# Patient Record
Sex: FEMALE | Race: WHITE | NOT HISPANIC OR LATINO | Employment: PART TIME
[De-identification: names, ages, dates, MRNs, and addresses within clinical notes are randomized per-mention and may not be internally consistent; named-entity substitution may affect disease eponyms.]

---

## 2023-09-21 ENCOUNTER — TRANSCRIBE ORDERS (OUTPATIENT)
Dept: OTHER | Age: 58
End: 2023-09-21

## 2023-09-21 DIAGNOSIS — K11.8 PAROTID MASS: Primary | ICD-10-CM

## 2023-09-22 ENCOUNTER — TELEPHONE (OUTPATIENT)
Dept: OTOLARYNGOLOGY | Facility: CLINIC | Age: 58
End: 2023-09-22
Payer: COMMERCIAL

## 2023-09-22 NOTE — TELEPHONE ENCOUNTER
FUTURE VISIT INFORMATION:      FUTURE VISIT INFORMATION:  Date: 10/11/23  Time: 3 PM  Location: [facility]  REFERRAL INFORMATION:  Referring provider: John Simon MD   Referring providers clinic: [facility]  Reason for visit/diagnosis:  Parotid mass referred from Dr. John Simon     RECORDS REQUESTED FROM:       Clinic name Comments Records Status Imaging Status   [facility] 9/18/2023 note from John Simon MD  CE    Imaging  MRN: [id number]  US FNA Superficial 7/31/2023  CT neck 7/18/2023  US Neck/Head 12/14/2022 - requested -PACS CE PACS   [facility]  [address] [facility] [address]  Phone [phone]  Fax [phone]             7/31/2023 Case: [id number]   FINAL DIAGNOSIS    A.  Salivary Gland, Parotid, right, fine needle aspiration:  Non-diagnostic: Insufficient cellular material for cytologic interpretation     Comment:  Scant benign salivary acini and stromal elements present.  Reviewed by RWS.     [employer]ex: [id number] CE    Path req 10/3/23    Received path 10/4/23             September 22, 2023 at 1:22 PM - req for 2022 US neck pushed to [place] -Ena  September 25, 2023 at 5:20 PM - Received US neck already resolved in PACS.  October 3, 2023 at 6:55 AM - request for path slides -Ena  October 4, 2023 3:35 PM - Received path from lewis and took it to 5th fl to be process - Fabienne

## 2023-09-22 NOTE — TELEPHONE ENCOUNTER
[facility] Call Center    Phone Message    May a detailed message be left on voicemail: yes     Reason for Call: Appointment Intake    Referring Provider Name: Dr Todd Anderson [facility]  Diagnosis and/or Symptoms: Right parotid mass.     Per Heide ROMERO to have reviewed    Action Taken: Message routed to:  [facility] ENT    Travel Screening: Not Applicable

## 2023-10-05 ENCOUNTER — LAB REQUISITION (OUTPATIENT)
Dept: LAB | Facility: CLINIC | Age: 58
End: 2023-10-05
Payer: COMMERCIAL

## 2023-10-05 LAB
PATH REPORT.COMMENTS IMP SPEC: NORMAL
PATH REPORT.FINAL DX SPEC: NORMAL
PATH REPORT.MICROSCOPIC SPEC OTHER STN: NORMAL
PATH REPORT.RELEVANT HX SPEC: NORMAL
PATH REPORT.RELEVANT HX SPEC: NORMAL
PATH REPORT.SITE OF ORIGIN SPEC: NORMAL

## 2023-10-05 PROCEDURE — 88321 CONSLTJ&REPRT SLD PREP ELSWR: CPT | Performed by: PATHOLOGY

## 2023-10-11 ENCOUNTER — PRE VISIT (OUTPATIENT)
Dept: OTOLARYNGOLOGY | Facility: CLINIC | Age: 58
End: 2023-10-11

## 2023-10-11 ENCOUNTER — OFFICE VISIT (OUTPATIENT)
Dept: OTOLARYNGOLOGY | Facility: CLINIC | Age: 58
End: 2023-10-11
Attending: OTOLARYNGOLOGY
Payer: COMMERCIAL

## 2023-10-11 VITALS
WEIGHT: 115 LBS | OXYGEN SATURATION: 96 % | SYSTOLIC BLOOD PRESSURE: 146 MMHG | BODY MASS INDEX: 19.63 KG/M2 | TEMPERATURE: 98.9 F | HEIGHT: 64 IN | HEART RATE: 87 BPM | DIASTOLIC BLOOD PRESSURE: 73 MMHG

## 2023-10-11 DIAGNOSIS — K11.8 PAROTID MASS: ICD-10-CM

## 2023-10-11 PROCEDURE — 10005 FNA BX W/US GDN 1ST LES: CPT | Performed by: OTOLARYNGOLOGY

## 2023-10-11 PROCEDURE — 88305 TISSUE EXAM BY PATHOLOGIST: CPT | Mod: 26 | Performed by: PATHOLOGY

## 2023-10-11 PROCEDURE — 88173 CYTOPATH EVAL FNA REPORT: CPT | Mod: 26 | Performed by: PATHOLOGY

## 2023-10-11 PROCEDURE — 99204 OFFICE O/P NEW MOD 45 MIN: CPT | Mod: 25 | Performed by: OTOLARYNGOLOGY

## 2023-10-11 PROCEDURE — 88173 CYTOPATH EVAL FNA REPORT: CPT | Mod: TC | Performed by: OTOLARYNGOLOGY

## 2023-10-11 ASSESSMENT — PAIN SCALES - GENERAL: PAINLEVEL: NO PAIN (1)

## 2023-10-11 NOTE — NURSING NOTE
"Chief Complaint   Patient presents with    Consult     Parotid mass      Blood pressure (!) 146/73, pulse 87, temperature 98.9  F (37.2  C), height 1.626 m (5' 4\"), weight 52.2 kg (115 lb), SpO2 96%.  Nicolas Grigsby LPN    "

## 2023-10-11 NOTE — PROGRESS NOTES
History of Present Illness: The patient is a 58-year-old woman who was recently seen by Dr. John Simon and noted to have some discomfort on the left side of her cheek.  Imaging was obtained which actually revealed nothing on the left side but a mass in the right parotid gland.  Patient initially noticed it to be very small but feels it is grown a fair amount over the last few weeks with possible doubling in size.  He has not had any facial nerve weakness or twitching and she denies any numbness in a greater auricular nerve distribution.  She has not noticed any lumps or bumps in her neck.  She did have a fine-needle biopsy performed in the outside with ultrasound guidance which was nondiagnostic.        No past medical history on file.  No past surgical history on file.  No current outpatient medications on file.  No Known Allergies  Social History     Tobacco Use    Smoking status: Never    Smokeless tobacco: Never   Substance Use Topics    Alcohol use: Yes     Alcohol/week: 3.0 standard drinks of alcohol     Types: 3 Standard drinks or equivalent per week     Review Of Systems  Skin: negative  Eyes: negative  Ears/Nose/Throat: negative  Respiratory: No shortness of breath, dyspnea on exertion, cough, or hemoptysis  Cardiovascular: negative  Gastrointestinal: negative  Genitourinary: negative  Musculoskeletal: negative  Neurologic: negative  Psychiatric: negative  Hematologic/Lymphatic/Immunologic: negative  Endocrine: negative    Physical examination: The patient is well-appearing in no distress although quite anxious.  Examination of the oral cavity reveals normal mucosa of the tongue, floor mouth, genital and bucca mucosa, hard and soft palate, and posterior pharyngeal wall.  Palpation of the floor mouth, tongue and tongue base are negative.  She cannot tolerate mirror exam.  Examination of the neck reveals a rubbery mass palpable in the preauricular and infra-auricular area on the right side consistent with  a parotid lesion.  The rest of the neck and the left rotted are negative.    Imaging: The patient CT scan and ultrasound were reviewed, the CT scan suggested heterogeneous contrast-enhancing mass within the parotid gland which is not overtly malignant appearing.  On ultrasound, the mass appears entirely anechoic with smooth borders.    Procedure: Repeat fine biopsy was done of the right parotid mass under ultrasound guidance, 2 separate passes with a 25-gauge needle were taken with ultrasound confirmation of the needle well within the mass.    Impression: Right parotid mass, as yet undetermined pathologic diagnosis    Plan:  1.  I counseled the patient that most likely she will require a right-sided parotidectomy.  We discussed the nature of the incision, and risks which include, but are not limited to, facial nerve weakness or injury, preauricular nerve distribution numbness and Bj's syndrome.  All of their questions were answered.    2.  I counseled the patient and  that I prefer to get a diagnosis ahead of time if possible hence today's biopsy.  However most of the lesions that are generated in the parotid gland turned out to be benign and my suspicion is that this would be a benign lesion or low-grade malignancy.  However, they understand that without a diagnosis ahead of time, it certainly could be a malignancy and I would only be able to ascertain that during intraoperative pathologic analysis.    3.  We will wait for the biopsy result from today and hope for diagnostic specimen but likely proceed to surgery regardless.    Armani Fraser M.D.

## 2023-10-12 ENCOUNTER — PREP FOR PROCEDURE (OUTPATIENT)
Dept: OTOLARYNGOLOGY | Facility: CLINIC | Age: 58
End: 2023-10-12
Payer: COMMERCIAL

## 2023-10-12 DIAGNOSIS — K11.8 PAROTID MASS: Primary | ICD-10-CM

## 2023-10-12 NOTE — NURSING NOTE
Teaching Flowsheet - ENT   Relevant Diagnosis:  parotid mass  Teaching Topic: parotidectomy   Person(s) involved in teaching: patient and spouse        Motivation Level: high  Asks Questions:   Yes  Eager to Learn:   Yes  Cooperative:   Yes  Receptive (willing/able to accept information):   Yes  Comments: Reviewed pre-op H and P,  NPO prior to  surgery,  pre-op scrub (given Hibiclens)  Reviewed post-op  cares , activity and pain.     Patient demonstrates understanding of the following:  Reason for the appointment, diagnosis and treatment plan:   Yes  Knowledge of proper use of medications and conditions for which they are ordered (with special attention to potential side effects or drug interactions):  stop aspirin products 1 week before surgery Yes  Which situations necessitate calling provider and whom to contact:   Yes  Nutritional needs and diet plan:   Yes  Pain management techniques:   Yes  Patient instructed on hand hygiene:  Yes  How and/when to access community resources:   Yes     Infection Prevention:  Patient   demonstrates understanding of the following:  Surgical procedure site care taught Yes  Signs and symptoms of infection taught Yes  Wound care taught Yes  Instructional Materials Used/Given: pre- op booklet,verbal  Instruction.    Teri Ramirez, RN, BSN

## 2023-10-12 NOTE — PATIENT INSTRUCTIONS
1. We will call you to arrange date/time for surgery  2. Please schedule a pre-op physical with their primary MD within 30 days of the procedure.   3. Please avoid blood thinning medications 1 week prior to surgery (Ibuprofen, Aleve, Aspirin, etc.)   4. Please review contents within the surgical packet & use the antiseptic scrub as directed.   5. Please call NEELAM Williamson with any further questions [phone]

## 2023-10-16 LAB
PATH REPORT.COMMENTS IMP SPEC: NORMAL
PATH REPORT.COMMENTS IMP SPEC: NORMAL
PATH REPORT.FINAL DX SPEC: NORMAL
PATH REPORT.GROSS SPEC: NORMAL
PATH REPORT.MICROSCOPIC SPEC OTHER STN: NORMAL
PATH REPORT.RELEVANT HX SPEC: NORMAL

## 2023-10-25 ENCOUNTER — PATIENT OUTREACH (OUTPATIENT)
Dept: OTOLARYNGOLOGY | Facility: CLINIC | Age: 58
End: 2023-10-25
Payer: COMMERCIAL

## 2023-10-25 ENCOUNTER — TELEPHONE (OUTPATIENT)
Dept: OTOLARYNGOLOGY | Facility: CLINIC | Age: 58
End: 2023-10-25
Payer: COMMERCIAL

## 2023-10-25 NOTE — TELEPHONE ENCOUNTER
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 Fraser. Patient verbalized understanding. Reviewed pre-op teaching and general post op care. Patient is scheduled for surgery with Dr. Fraser on 11/2. She was encouraged to call with further questions or concerns.       Teri Ramirez, RN, BSN

## 2023-10-25 NOTE — TELEPHONE ENCOUNTER
Patient is scheduled for surgery with Dr. Fraser.     Spoke with: Patient     Date of Surgery: 11/02/23    Location: [facility] OR     Pre op with Provider: ANA     H&P: Patient will schedule pre op at [facility]. Informed patient pre op will need to be done within 30 days of scheduled surgery date.     Additional imaging/appointments: Patient is scheduled for a 1 week post op with Dr. Fraser on 11/08/23 at 4:30.     Surgery packet: Per patients preference, will mail packet to address in chart as well as send through [employer]. Patient made aware arrival time will not be listed within packet.     Additional comments: Writer informed patient a pre op nurse will call a few days prior to surgery to go over further details/give arrival time.   Patient asked for a call back to further discuss biopsy results. Patient said she was able to view them in [employer] but has yet to speak with anyone in clinic.         Steph Villa on 10/25/2023 at 10:14 AM

## 2023-10-27 NOTE — TELEPHONE ENCOUNTER
Patient returned call regarding rescheduling surgery with Dr. Fraser. Patient said she would prefer to keep surgery on 11/02/23 because her  has taken off work that day and will be able to give her a ride post surgery. Writer informed patient that is absolutely fine and we will keep her surgery as is.     Steph Villa on 10/27/2023 at 1:04 PM

## 2023-10-27 NOTE — TELEPHONE ENCOUNTER
Patient is currently scheduled for surgery with Dr. Fraser on 11/02/23, called patient to offer earlier surgery date of 10/30/23 as there has been a cancellation.   No answer, callback number [phone] left on  for patient.     Steph Villa on 10/27/2023 at 12:49 PM

## 2023-11-02 ENCOUNTER — HOSPITAL ENCOUNTER (OUTPATIENT)
Facility: CLINIC | Age: 58
Discharge: HOME | End: 2023-11-02
Attending: OTOLARYNGOLOGY | Admitting: OTOLARYNGOLOGY
Payer: COMMERCIAL

## 2023-11-02 ENCOUNTER — ANESTHESIA (OUTPATIENT)
Dept: SURGERY | Facility: CLINIC | Age: 58
End: 2023-11-02
Payer: COMMERCIAL

## 2023-11-02 ENCOUNTER — ANESTHESIA EVENT (OUTPATIENT)
Dept: SURGERY | Facility: CLINIC | Age: 58
End: 2023-11-02
Payer: COMMERCIAL

## 2023-11-02 VITALS
SYSTOLIC BLOOD PRESSURE: 141 MMHG | WEIGHT: 116.4 LBS | RESPIRATION RATE: 15 BRPM | HEART RATE: 110 BPM | DIASTOLIC BLOOD PRESSURE: 77 MMHG | TEMPERATURE: 98.7 F | BODY MASS INDEX: 19.87 KG/M2 | HEIGHT: 64 IN | OXYGEN SATURATION: 94 %

## 2023-11-02 DIAGNOSIS — D49.0 PAROTID NEOPLASM: Primary | ICD-10-CM

## 2023-11-02 PROCEDURE — 370N000017 HC ANESTHESIA TECHNICAL FEE, PER MIN: Performed by: OTOLARYNGOLOGY

## 2023-11-02 PROCEDURE — 360N000077 HC SURGERY LEVEL 4, PER MIN: Performed by: OTOLARYNGOLOGY

## 2023-11-02 PROCEDURE — 250N000011 HC RX IP 250 OP 636: Performed by: ANESTHESIOLOGY

## 2023-11-02 PROCEDURE — 250N000011 HC RX IP 250 OP 636: Performed by: OTOLARYNGOLOGY

## 2023-11-02 PROCEDURE — 250N000009 HC RX 250: Performed by: NURSE ANESTHETIST, CERTIFIED REGISTERED

## 2023-11-02 PROCEDURE — 258N000003 HC RX IP 258 OP 636: Performed by: OTOLARYNGOLOGY

## 2023-11-02 PROCEDURE — 88307 TISSUE EXAM BY PATHOLOGIST: CPT | Mod: TC | Performed by: OTOLARYNGOLOGY

## 2023-11-02 PROCEDURE — 88307 TISSUE EXAM BY PATHOLOGIST: CPT | Mod: 26 | Performed by: STUDENT IN AN ORGANIZED HEALTH CARE EDUCATION/TRAINING PROGRAM

## 2023-11-02 PROCEDURE — 258N000003 HC RX IP 258 OP 636: Performed by: ANESTHESIOLOGY

## 2023-11-02 PROCEDURE — 710N000012 HC RECOVERY PHASE 2, PER MINUTE: Performed by: OTOLARYNGOLOGY

## 2023-11-02 PROCEDURE — 250N000025 HC SEVOFLURANE, PER MIN: Performed by: OTOLARYNGOLOGY

## 2023-11-02 PROCEDURE — 42415 EXCISE PAROTID GLAND/LESION: CPT | Mod: RT | Performed by: OTOLARYNGOLOGY

## 2023-11-02 PROCEDURE — 250N000011 HC RX IP 250 OP 636: Mod: JZ | Performed by: NURSE ANESTHETIST, CERTIFIED REGISTERED

## 2023-11-02 PROCEDURE — 88331 PATH CONSLTJ SURG 1 BLK 1SPC: CPT | Mod: TC | Performed by: OTOLARYNGOLOGY

## 2023-11-02 PROCEDURE — 88342 IMHCHEM/IMCYTCHM 1ST ANTB: CPT | Mod: 26 | Performed by: STUDENT IN AN ORGANIZED HEALTH CARE EDUCATION/TRAINING PROGRAM

## 2023-11-02 PROCEDURE — 88331 PATH CONSLTJ SURG 1 BLK 1SPC: CPT | Mod: 26 | Performed by: STUDENT IN AN ORGANIZED HEALTH CARE EDUCATION/TRAINING PROGRAM

## 2023-11-02 PROCEDURE — 710N000010 HC RECOVERY PHASE 1, LEVEL 2, PER MIN: Performed by: OTOLARYNGOLOGY

## 2023-11-02 PROCEDURE — 258N000003 HC RX IP 258 OP 636: Performed by: NURSE ANESTHETIST, CERTIFIED REGISTERED

## 2023-11-02 PROCEDURE — 88341 IMHCHEM/IMCYTCHM EA ADD ANTB: CPT | Mod: 26 | Performed by: STUDENT IN AN ORGANIZED HEALTH CARE EDUCATION/TRAINING PROGRAM

## 2023-11-02 PROCEDURE — 250N000009 HC RX 250: Performed by: OTOLARYNGOLOGY

## 2023-11-02 PROCEDURE — 88360 TUMOR IMMUNOHISTOCHEM/MANUAL: CPT | Mod: 26 | Performed by: STUDENT IN AN ORGANIZED HEALTH CARE EDUCATION/TRAINING PROGRAM

## 2023-11-02 PROCEDURE — 272N000001 HC OR GENERAL SUPPLY STERILE: Performed by: OTOLARYNGOLOGY

## 2023-11-02 PROCEDURE — 999N000141 HC STATISTIC PRE-PROCEDURE NURSING ASSESSMENT: Performed by: OTOLARYNGOLOGY

## 2023-11-02 RX ORDER — SODIUM CHLORIDE, SODIUM LACTATE, POTASSIUM CHLORIDE, CALCIUM CHLORIDE 600; 310; 30; 20 MG/100ML; MG/100ML; MG/100ML; MG/100ML
INJECTION, SOLUTION INTRAVENOUS CONTINUOUS
Status: DISCONTINUED | OUTPATIENT
Start: 2023-11-02 | End: 2023-11-02 | Stop reason: HOSPADM

## 2023-11-02 RX ORDER — DEXAMETHASONE SODIUM PHOSPHATE 4 MG/ML
INJECTION, SOLUTION INTRA-ARTICULAR; INTRALESIONAL; INTRAMUSCULAR; INTRAVENOUS; SOFT TISSUE PRN
Status: DISCONTINUED | OUTPATIENT
Start: 2023-11-02 | End: 2023-11-02

## 2023-11-02 RX ORDER — ONDANSETRON 4 MG/1
4 TABLET, ORALLY DISINTEGRATING ORAL EVERY 30 MIN PRN
Status: DISCONTINUED | OUTPATIENT
Start: 2023-11-02 | End: 2023-11-02 | Stop reason: HOSPADM

## 2023-11-02 RX ORDER — SODIUM CHLORIDE, SODIUM LACTATE, POTASSIUM CHLORIDE, CALCIUM CHLORIDE 600; 310; 30; 20 MG/100ML; MG/100ML; MG/100ML; MG/100ML
INJECTION, SOLUTION INTRAVENOUS CONTINUOUS PRN
Status: DISCONTINUED | OUTPATIENT
Start: 2023-11-02 | End: 2023-11-02

## 2023-11-02 RX ORDER — ONDANSETRON 2 MG/ML
INJECTION INTRAMUSCULAR; INTRAVENOUS PRN
Status: DISCONTINUED | OUTPATIENT
Start: 2023-11-02 | End: 2023-11-02

## 2023-11-02 RX ORDER — OXYCODONE HYDROCHLORIDE 5 MG/1
5 TABLET ORAL
Status: DISCONTINUED | OUTPATIENT
Start: 2023-11-02 | End: 2023-11-02 | Stop reason: HOSPADM

## 2023-11-02 RX ORDER — DEXAMETHASONE SODIUM PHOSPHATE 10 MG/ML
10 INJECTION, SOLUTION INTRAMUSCULAR; INTRAVENOUS ONCE
Status: DISCONTINUED | OUTPATIENT
Start: 2023-11-02 | End: 2023-11-02 | Stop reason: HOSPADM

## 2023-11-02 RX ORDER — ONDANSETRON 2 MG/ML
4 INJECTION INTRAMUSCULAR; INTRAVENOUS EVERY 30 MIN PRN
Status: DISCONTINUED | OUTPATIENT
Start: 2023-11-02 | End: 2023-11-02 | Stop reason: HOSPADM

## 2023-11-02 RX ORDER — CEFAZOLIN SODIUM/WATER 2 G/20 ML
2 SYRINGE (ML) INTRAVENOUS
Status: COMPLETED | OUTPATIENT
Start: 2023-11-02 | End: 2023-11-02

## 2023-11-02 RX ORDER — PROPOFOL 10 MG/ML
INJECTION, EMULSION INTRAVENOUS PRN
Status: DISCONTINUED | OUTPATIENT
Start: 2023-11-02 | End: 2023-11-02

## 2023-11-02 RX ORDER — FENTANYL CITRATE 50 UG/ML
INJECTION, SOLUTION INTRAMUSCULAR; INTRAVENOUS PRN
Status: DISCONTINUED | OUTPATIENT
Start: 2023-11-02 | End: 2023-11-02

## 2023-11-02 RX ORDER — OXYCODONE HYDROCHLORIDE 10 MG/1
10 TABLET ORAL
Status: DISCONTINUED | OUTPATIENT
Start: 2023-11-02 | End: 2023-11-02 | Stop reason: HOSPADM

## 2023-11-02 RX ORDER — LIDOCAINE HYDROCHLORIDE 20 MG/ML
INJECTION, SOLUTION INFILTRATION; PERINEURAL PRN
Status: DISCONTINUED | OUTPATIENT
Start: 2023-11-02 | End: 2023-11-02

## 2023-11-02 RX ORDER — OXYCODONE HYDROCHLORIDE 5 MG/1
5 TABLET ORAL EVERY 6 HOURS PRN
Qty: 8 TABLET | Refills: 0 | Status: SHIPPED | OUTPATIENT
Start: 2023-11-02 | End: 2023-11-05

## 2023-11-02 RX ORDER — HYDROMORPHONE HCL IN WATER/PF 6 MG/30 ML
0.2 PATIENT CONTROLLED ANALGESIA SYRINGE INTRAVENOUS EVERY 5 MIN PRN
Status: DISCONTINUED | OUTPATIENT
Start: 2023-11-02 | End: 2023-11-02 | Stop reason: HOSPADM

## 2023-11-02 RX ORDER — FENTANYL CITRATE 50 UG/ML
25 INJECTION, SOLUTION INTRAMUSCULAR; INTRAVENOUS EVERY 5 MIN PRN
Status: DISCONTINUED | OUTPATIENT
Start: 2023-11-02 | End: 2023-11-02 | Stop reason: HOSPADM

## 2023-11-02 RX ORDER — CEFAZOLIN SODIUM/WATER 2 G/20 ML
2 SYRINGE (ML) INTRAVENOUS SEE ADMIN INSTRUCTIONS
Status: DISCONTINUED | OUTPATIENT
Start: 2023-11-02 | End: 2023-11-02 | Stop reason: HOSPADM

## 2023-11-02 RX ORDER — HYDROMORPHONE HCL IN WATER/PF 6 MG/30 ML
0.4 PATIENT CONTROLLED ANALGESIA SYRINGE INTRAVENOUS EVERY 5 MIN PRN
Status: DISCONTINUED | OUTPATIENT
Start: 2023-11-02 | End: 2023-11-02 | Stop reason: HOSPADM

## 2023-11-02 RX ORDER — LIDOCAINE 40 MG/G
CREAM TOPICAL
Status: DISCONTINUED | OUTPATIENT
Start: 2023-11-02 | End: 2023-11-02 | Stop reason: HOSPADM

## 2023-11-02 RX ORDER — FENTANYL CITRATE 50 UG/ML
50 INJECTION, SOLUTION INTRAMUSCULAR; INTRAVENOUS EVERY 5 MIN PRN
Status: DISCONTINUED | OUTPATIENT
Start: 2023-11-02 | End: 2023-11-02 | Stop reason: HOSPADM

## 2023-11-02 RX ADMIN — FENTANYL CITRATE 100 MCG: 50 INJECTION INTRAMUSCULAR; INTRAVENOUS at 10:27

## 2023-11-02 RX ADMIN — SODIUM CHLORIDE, POTASSIUM CHLORIDE, SODIUM LACTATE AND CALCIUM CHLORIDE: 600; 310; 30; 20 INJECTION, SOLUTION INTRAVENOUS at 10:22

## 2023-11-02 RX ADMIN — HYDROMORPHONE HYDROCHLORIDE 0.25 MG: 1 INJECTION, SOLUTION INTRAMUSCULAR; INTRAVENOUS; SUBCUTANEOUS at 12:41

## 2023-11-02 RX ADMIN — MIDAZOLAM 2 MG: 1 INJECTION INTRAMUSCULAR; INTRAVENOUS at 10:21

## 2023-11-02 RX ADMIN — REMIFENTANIL HYDROCHLORIDE 0.05 MCG/KG/MIN: 1 INJECTION, POWDER, LYOPHILIZED, FOR SOLUTION INTRAVENOUS at 10:40

## 2023-11-02 RX ADMIN — LIDOCAINE HYDROCHLORIDE 100 MG: 20 INJECTION, SOLUTION INFILTRATION; PERINEURAL at 10:27

## 2023-11-02 RX ADMIN — PROPOFOL 150 MG: 10 INJECTION, EMULSION INTRAVENOUS at 10:27

## 2023-11-02 RX ADMIN — FENTANYL CITRATE 25 MCG: 50 INJECTION, SOLUTION INTRAMUSCULAR; INTRAVENOUS at 13:15

## 2023-11-02 RX ADMIN — FENTANYL CITRATE 25 MCG: 50 INJECTION, SOLUTION INTRAMUSCULAR; INTRAVENOUS at 13:40

## 2023-11-02 RX ADMIN — ONDANSETRON 4 MG: 2 INJECTION INTRAMUSCULAR; INTRAVENOUS at 12:13

## 2023-11-02 RX ADMIN — SODIUM CHLORIDE, POTASSIUM CHLORIDE, SODIUM LACTATE AND CALCIUM CHLORIDE: 600; 310; 30; 20 INJECTION, SOLUTION INTRAVENOUS at 10:40

## 2023-11-02 RX ADMIN — Medication 5 MG: at 10:27

## 2023-11-02 RX ADMIN — PHENYLEPHRINE HYDROCHLORIDE 100 MCG: 10 INJECTION INTRAVENOUS at 10:46

## 2023-11-02 RX ADMIN — SUCCINYLCHOLINE CHLORIDE 100 MG: 20 INJECTION, SOLUTION INTRAMUSCULAR; INTRAVENOUS; PARENTERAL at 10:27

## 2023-11-02 RX ADMIN — FENTANYL CITRATE 25 MCG: 50 INJECTION, SOLUTION INTRAMUSCULAR; INTRAVENOUS at 13:23

## 2023-11-02 RX ADMIN — PHENYLEPHRINE HYDROCHLORIDE 0.2 MCG/KG/MIN: 10 INJECTION INTRAVENOUS at 10:46

## 2023-11-02 RX ADMIN — DEXAMETHASONE SODIUM PHOSPHATE 10 MG: 4 INJECTION, SOLUTION INTRA-ARTICULAR; INTRALESIONAL; INTRAMUSCULAR; INTRAVENOUS; SOFT TISSUE at 10:27

## 2023-11-02 RX ADMIN — Medication 2 G: at 10:40

## 2023-11-02 RX ADMIN — FENTANYL CITRATE 25 MCG: 50 INJECTION, SOLUTION INTRAMUSCULAR; INTRAVENOUS at 14:17

## 2023-11-02 ASSESSMENT — ACTIVITIES OF DAILY LIVING (ADL)
ADLS_ACUITY_SCORE: 33
ADLS_ACUITY_SCORE: 35

## 2023-11-02 NOTE — BRIEF OP NOTE
[facility]    Brief Operative Note    Pre-operative diagnosis: Parotid mass [K11.8]  Post-operative diagnosis Same as pre-operative diagnosis    Procedure: Right parotidectomy, Right - Neck    Surgeon: Surgeon(s) and Role:     * Armani Fraser MD - Primary     * Vik Mckeon MD - Resident - Assisting  Anesthesia: General   Estimated Blood Loss: 50 mL    Drains: Lion-Armstrong  Specimens:   ID Type Source Tests Collected by Time Destination   1 : RIGHT PAROTID Tissue Parotid Gland, Right SURGICAL PATHOLOGY EXAM Armani Fraser MD 11/2/2023 12:05 PM      Findings:   Frozen section on tumor c/w pleomorphic adenoma .  Facial nerve right intact and all branching stimulating at 0.5 mAmp  Complications: None.  Implants: * No implants in log *

## 2023-11-02 NOTE — ANESTHESIA POSTPROCEDURE EVALUATION
Patient: Sina Aragon    Procedure: Procedure(s):  Right parotidectomy       Anesthesia Type:  General    Note:  Disposition: Outpatient   Postop Pain Control: Uneventful            Sign Out: Well controlled pain   PONV: No   Neuro/Psych: Uneventful            Sign Out: Acceptable/Baseline neuro status   Airway/Respiratory: Uneventful            Sign Out: Acceptable/Baseline resp. status   CV/Hemodynamics: Uneventful            Sign Out: Acceptable CV status; No obvious hypovolemia; No obvious fluid overload   Other NRE: NONE   DID A NON-ROUTINE EVENT OCCUR? No           Last vitals:  Vitals Value Taken Time   /77 11/02/23 1430   Temp 37.3  C (99.1  F) 11/02/23 1430   Pulse 114 11/02/23 1444   Resp 14 11/02/23 1444   SpO2 95 % 11/02/23 1444   Vitals shown include unfiled device data.    Electronically Signed By: Brit Nina MD  November 2, 2023  2:45 PM

## 2023-11-02 NOTE — ANESTHESIA PROCEDURE NOTES
Airway       Patient location during procedure: OR       Procedure Start/Stop Times: 11/2/2023 10:33 AM  Staff -        Performed By: SRNAIndications and Patient Condition       Indications for airway management: ira-procedural       Induction type:intravenous      Final Airway Details       Final airway type: endotracheal airway       Successful airway: ETT - single  Endotracheal Airway Details        ETT size (mm): 7.0       Cuffed: yes       Cuff volume (mL): 10       Successful intubation technique: video laryngoscopy       VL Blade Size: MAC 3       Grade View of Cords: 1       Adjucts: stylet       Position: Left       Measured from: gums/teeth       Secured at (cm): 21       Bite block used: None    Post intubation assessment        Placement verified by: capnometry and equal breath sounds        Number of attempts at approach: 1       Number of other approaches attempted: 0       Secured with: pink tape       Ease of procedure: easy       Dentition: Intact and Unchanged    Medication(s) Administered   Medication Administration Time: 11/2/2023 10:33 AM

## 2023-11-02 NOTE — ANESTHESIA CARE TRANSFER NOTE
Patient: Sina Aragon    Procedure: Procedure(s):  Right parotidectomy       Diagnosis: Parotid mass [K11.8]  Diagnosis Additional Information: No value filed.    Anesthesia Type:   No value filed.     Note:    Oropharynx: oropharynx clear of all foreign objects  Level of Consciousness: awake  Oxygen Supplementation: face mask  Level of Supplemental Oxygen (L/min / FiO2): 6  Independent Airway: airway patency satisfactory and stable  Dentition: dentition unchanged  Vital Signs Stable: post-procedure vital signs reviewed and stable    Patient transferred to: PACU    Handoff Report: Identifed the Patient, Identified the Reponsible Provider, Reviewed the pertinent medical history, Discussed the surgical course, Reviewed Intra-OP anesthesia mangement and issues during anesthesia, Set expectations for post-procedure period and Allowed opportunity for questions and acknowledgement of understanding      Vitals:  Vitals Value Taken Time   /86 11/02/23 1259   Temp     Pulse 114 11/02/23 1305   Resp 16 11/02/23 1305   SpO2 97 % 11/02/23 1305   Vitals shown include unfiled device data.    Electronically Signed By: MARY Valdez CRNA  November 2, 2023  1:06 PM

## 2023-11-02 NOTE — ANESTHESIA PREPROCEDURE EVALUATION
"Anesthesia Pre-Procedure Evaluation    Patient: Sina Aragon   MRN: [id number] : 1965        Procedure : Procedure(s):  Right parotidectomy          Past Medical History:   Diagnosis Date    Adenomatous polyp of colon 10/2018    Anemia     Cervical spondylosis without myelopathy     Elongated styloid process syndrome     Endometrial polyp     Mass of right parotid gland       Past Surgical History:   Procedure Laterality Date    DILATION AND CURETTAGE      removal of wisdom teeth Bilateral       No Known Allergies   Social History     Tobacco Use    Smoking status: Never    Smokeless tobacco: Never   Substance Use Topics    Alcohol use: Yes     Alcohol/week: 3.0 standard drinks of alcohol     Types: 3 Standard drinks or equivalent per week     Comment: 2 glasses of wine per week, 3 cans of beer per week      Wt Readings from Last 1 Encounters:   23 52.8 kg (116 lb 6.5 oz)        Anesthesia Evaluation   Pt has had prior anesthetic. Type: General and MAC.        ROS/MED HX  ENT/Pulmonary:       Neurologic:       Cardiovascular:       METS/Exercise Tolerance:     Hematologic:       Musculoskeletal:       GI/Hepatic:       Renal/Genitourinary:       Endo:       Psychiatric/Substance Use:       Infectious Disease:       Malignancy:       Other:            Physical Exam    Airway        Mallampati: I   TM distance: > 3 FB   Neck ROM: full   Mouth opening: > 3 cm    Respiratory Devices and Support         Dental       (+) Completely normal teeth      Cardiovascular          Rhythm and rate: regular and normal     Pulmonary           breath sounds clear to auscultation           OUTSIDE LABS:  CBC: No results found for: \"WBC\", \"HGB\", \"HCT\", \"PLT\"  BMP: No results found for: \"NA\", \"POTASSIUM\", \"CHLORIDE\", \"CO2\", \"BUN\", \"CR\", \"GLC\"  COAGS: No results found for: \"PTT\", \"INR\", \"FIBR\"  POC: No results found for: \"BGM\", \"HCG\", \"HCGS\"  HEPATIC: No results found for: \"ALBUMIN\", \"PROTTOTAL\", \"ALT\", \"AST\", \"GGT\", " "\"ALKPHOS\", \"BILITOTAL\", \"BILIDIRECT\", \"EVERETT\"  OTHER: No results found for: \"PH\", \"LACT\", \"A1C\", \"OSCAR\", \"PHOS\", \"MAG\", \"LIPASE\", \"AMYLASE\", \"TSH\", \"T4\", \"T3\", \"CRP\", \"SED\"    Anesthesia Plan    ASA Status:  1    NPO Status:  NPO Appropriate    Anesthesia Type: General.     - Airway: ETT   Induction: Intravenous.   Maintenance: Balanced.        Consents    Anesthesia Plan(s) and associated risks, benefits, and realistic alternatives discussed. Questions answered and patient/representative(s) expressed understanding.     - Discussed:     - Discussed with:  Patient      - Extended Intubation/Ventilatory Support Discussed: No.      - Patient is DNR/DNI Status: No     Use of blood products discussed: No .     Postoperative Care    Pain management: IV analgesics.   PONV prophylaxis: Ondansetron (or other 5HT-3), Dexamethasone or Solumedrol     Comments:    Other Comments: NPO. No meds. No recent URI. No CP or SOB. No problems with anesthesia in past.       H&P reviewed: Unable to attach H&P to encounter due to EHR limitations. H&P Update: appropriate H&P reviewed, patient examined. No interval changes since H&P (within 30 days).         Brit Nina MD  "

## 2023-11-02 NOTE — OR NURSING
Spoke to Dr. Mcfarland regarding patients new tachycardia post procedure. No further interventions at this time, will continue to monitor patient.

## 2023-11-02 NOTE — OP NOTE
NAME: Sina Aragon    MEDICAL RECORD NUMBER: [id number]    YOB: 1965    DATE OF SURGERY: November 2, 2023      SURGEON: Armani Madison MD    ASSISTANT SURGEON: Vik Mckeon MD    PREOPERATIVE DIAGNOSIS: Parotid mass [K11.8]    POSTOPERATIVE DIAGNOSIS: Parotid mass [K11.8]    PROCEDURE: Right parotidectomy, Right - Neck    INDICATIONS: The patient is a 58-year-old female with a right cheek mass.  CT preoperatively demonstrated a right parotid mass.  Preoperative fine-needle aspiration was consistent with pleomorphic adenoma.  After detailed discussion of the risks, benefits, and alternatives to surgery, patient elected to proceed with the aforementioned procedures.    ANESTHESIA: General; endotracheal intubation    FINDINGS: No intraoperative facial nerve identified with all branches preserved.  All branches stimulating at 0.5 mA at termination of case.  Tumor removed without rupture.  Frozen section consistent with pleomorphic adenoma.     EBL: 50 cc    SPECIMENS: Specimens:       ID Type Source Tests Collected by Time Destination   1 : RIGHT PAROTID Tissue Parotid Gland, Right SURGICAL PATHOLOGY EXAM Armani Fraser MD 11/2/2023 12:05 PM            COMPLICATIONS: None     DESCRIPTION OF PROCEDURE: The patient was taken the operating room and positioned supine on the operating table.  The patient was induced under general anesthesia and orotracheally intubated.  The bed was turned 108 degrees away from the anesthesia team and a shoulder roll was placed.  The head was turned to the left side.  The arms were tucked.  A modified John incision was marked out with a skin marker in the right preauricular area and this was injected with 1: 200,000 epinephrine solution.  Nerve monitoring electrodes were placed in 4 locations and nerve monitoring was used throughout the case.  The neck and face were prepped and draped in usual sterile fashion.  A full timeout was performed.  The incision was made  with a 15 blade down through skin, dermis, and subcutaneous tissue to the parotid masseteric fascia.  Skin flaps were elevated anteriorly in a sub-- superficial muscular aponeurotic system plane.  The greater auricular nerve was identified and ligated with clips.  The anterior border of the sternocleidomastoid muscle was identified and skeletonized.  The posterior belly of the digastric muscle was identified and traced back to its mastoid attachment.  The tragal cartilage was dissected away from the posterior border of the parotid gland.  The intervening tissue between the tragal pointer and Gabriel belly of the digastric was divided with monopolar cautery.  The main trunk of the facial nerve was identified about a centimeter anterior to the tympanomastoid suture line.  This was carefully traced anteriorly.  Nerve branches were dissected, ensuring preservation of the nerve as well as staying out of the tumor.  The tumor was located between the upper and lower divisions of the facial nerve at the pes anserineus.  The tumor was ultimately able to be dissected completely free away from the nerve branches.  This was passed off as a surgical specimen.  It was sent for frozen pathology.  This later resulted as pleomorphic adenoma.  Hemostasis was ensured with bipolar cautery.  The wound was irrigated with sterile saline.  The nerve probe was used to ensure proper function of the facial nerve.  All branches stimulated.  The wound was closed with deep buried 3-0 Vicryl suture.  A 7 Malagasy drain was placed and secured with 3-0 nylon.  The skin was closed with running subcuticular 5-0 Monocryl.  The skin was covered with Steri-Strips.  The Betadine was wiped away and the facial nerve monitoring electrodes were removed.  A jaw bra was placed.  The patient was turned back towards the anesthesia team and awoken and extubated.  She was taken to PACU in good condition.    Attending surgeon was present for entire case and completed  all critical portions.    DISPOSITION: Home     Vik Mckeon MD  ENT resident

## 2023-11-02 NOTE — OR NURSING
Dr. Mcfarland at bedside, will enter anesthesia sign out when able to. Ok to proceed with discharge.

## 2023-11-03 ENCOUNTER — ALLIED HEALTH/NURSE VISIT (OUTPATIENT)
Dept: OTOLARYNGOLOGY | Facility: CLINIC | Age: 58
End: 2023-11-03
Payer: COMMERCIAL

## 2023-11-03 ENCOUNTER — TELEPHONE (OUTPATIENT)
Dept: OTOLARYNGOLOGY | Facility: CLINIC | Age: 58
End: 2023-11-03

## 2023-11-03 DIAGNOSIS — K11.8 PAROTID MASS: Primary | ICD-10-CM

## 2023-11-03 PROCEDURE — 99207 PR NO CHARGE NURSE ONLY: CPT

## 2023-11-03 NOTE — PROGRESS NOTES
Patient in clinic today for MARLENE drain removal. Drain output in last 24 hours was 18 ml, indicating MARLENE removal as it was less than 30 ml in 24 hours. Incision site evaluated and it was clean, dry and intact without evidence of redness, swelling or drainage. MARLENE site was cleansed, suture around tubing was removed, drain bulb was opened, and drain was removed easily. Patient educated on signs and symptoms of infection, site care and provided number to call with further questions or concerns. Patient verbalized understanding and was encouraged to call with any further questions or concerns    Teri Ramirez, RN, BSN

## 2023-11-03 NOTE — TELEPHONE ENCOUNTER
[facility] Call Center    Phone Message    May a detailed message be left on voicemail: yes     Reason for Call: Other: Pt called in regards to drain that was put in after surgery on 11/1. Her drain output in the last 24hrs is 20ml and was instructed to call if it was less than 30 to have drain removed. Please call pt back to discuss and schedule     Action Taken: Other: [facility]    Travel Screening: Not Applicable

## 2023-11-08 ENCOUNTER — THERAPY VISIT (OUTPATIENT)
Dept: SPEECH THERAPY | Facility: CLINIC | Age: 58
End: 2023-11-08
Payer: COMMERCIAL

## 2023-11-08 ENCOUNTER — OFFICE VISIT (OUTPATIENT)
Dept: OTOLARYNGOLOGY | Facility: CLINIC | Age: 58
End: 2023-11-08
Payer: COMMERCIAL

## 2023-11-08 VITALS
OXYGEN SATURATION: 99 % | DIASTOLIC BLOOD PRESSURE: 88 MMHG | SYSTOLIC BLOOD PRESSURE: 128 MMHG | WEIGHT: 113 LBS | HEART RATE: 89 BPM | BODY MASS INDEX: 19.29 KG/M2 | HEIGHT: 64 IN | TEMPERATURE: 97.4 F

## 2023-11-08 DIAGNOSIS — R25.2 TRISMUS: Primary | ICD-10-CM

## 2023-11-08 DIAGNOSIS — K11.8 PAROTID MASS: Primary | ICD-10-CM

## 2023-11-08 PROCEDURE — 92610 EVALUATE SWALLOWING FUNCTION: CPT | Mod: GN | Performed by: SPEECH-LANGUAGE PATHOLOGIST

## 2023-11-08 PROCEDURE — 99024 POSTOP FOLLOW-UP VISIT: CPT | Performed by: OTOLARYNGOLOGY

## 2023-11-08 ASSESSMENT — PAIN SCALES - GENERAL: PAINLEVEL: NO PAIN (0)

## 2023-11-08 NOTE — PATIENT INSTRUCTIONS
Jaw stretch: 7-7-7 Protocol    Open your mouth as wide as you can. Hold for 7 seconds. Repeat 7 times in a row  Do this 7 different times per day.     Reach out if you are feeling like your jaw range of motion isn't improving.      France Lockhart MS, CCC-SLP  Speech-Language Pathology  [facility]  Department of Otolaryngology/D&T - 4th floor  Phone: [phone]  Email: [email or web address]

## 2023-11-08 NOTE — PROGRESS NOTES
SPEECH LANGUAGE PATHOLOGY EVALUATION    See electronic medical record for Abuse and Falls Screening details.    Subjective      Presenting condition or subjective complaint:   decreased jaw ROM   Date of onset: 11/02/23    Relevant medical history:     Dates & types of surgery:   11/2/23: Right Parotidectomy    Prior diagnostic imaging/testing results:       Prior therapy history for the same diagnosis, illness or injury:        Living Environment  Social support:     Help at home:    Equipment owned:       Employment:      Hobbies/Interests:      Patient goals for therapy:      Pain assessment:  Pt reports some minimal post surgical pain but manageable.     Objective     SWALLOW EVALUTION  Dysphagia history: Pt reports swallowing has been going ok. She has been making her food smaller so   Current Diet/Method of Nutritional Intake: thin liquids (level 0), minced & moist (level 5)        CLINICAL SWALLOW EVALUATION  Oral Motor Function: Dentition: natural dentition  Secretion management: WFL  Mucosal quality: good  Mandibular function: range of motion impaired, 26 mm at central incisors  Oral labial function: impaired pursing, slightly decreased lip rounding noted on the left following surgery  Lingual function: WFL  Velar function: intact   Buccal function: intact  Laryngeal function: cough, throat clear, volitional swallow, voicing WFL     Level of assist required for feeding: no assistance needed     ESOPHAGEAL PHASE OF SWALLOW  no observed or reported concerns related to esophageal function     SWALLOW ASSESSMENT CLINICAL IMPRESSIONS AND RATIONALE  Diet Consistency Recommendations: thin liquids (level 0), easy to chew (level 7), advance as tolerated to regular consistency     Recommended Feeding/Eating Techniques: slow rate of intake   Medication Administration Recommendations: Whole with liquid  Instrumental Assessment Recommendations: none     Assessment & Plan   CLINICAL IMPRESSIONS   Medical Diagnosis:  parotid mass    Treatment Diagnosis: trismus   Impression/Assessment: Pt is a 58 year old female with trismus complaints following surgery. The following significant findings have been identified:  impaired swallowing , characterized by impaired jaw range measuring at 26 mm today. She has slightly decreased lip rounding when moving her lips into pucker. Identified deficits interfere with their ability to  eat and drink as she did when  compared to previous level of function. Pt trained on 7-7-7 protocol during today session    PLAN OF CARE  Treatment Interventions: Swallowing dysfunction and/or oral function for feeding    Prognosis to achieve stated therapy goals is excellent   Rehab potential is impacted by: comorbidities    Long Term Goals   SLP Goal 1  Goal Identifier: Jaw ROM  Goal Description: Pt will demonstrate jaw ROM >35 mm by completing 7-7-7 protocol daily.  Rationale: To maximize safety, ease and/or independence of oral intake  Target Date: 02/05/24      Frequency of Treatment:    Duration of Treatment:       Recommended Referrals to Other Professionals: none  Education Assessment:   Learner/Method: Patient;Family;No Barriers to Learning    Risks and benefits of evaluation/treatment have been explained.   Patient/Family/caregiver agrees with Plan of Care.     Evaluation Time:    SLP Eval: oral/pharyngeal swallow function, clinical minutes (50744): 11        Signing Clinician: BINTA Moeller

## 2023-11-08 NOTE — LETTER
11/8/2023       RE: Sina Aragon  [address] [facility] [id number]     Dear Colleague,    Thank you for referring your patient, Sina Aragon, to the [facility] [place] at [facility]. Please see a copy of my visit note below.    The patient returns 1 week out from a right sided superficial parotidectomy.  The pathology on frozen section was a pleomorphic adenoma but we do not have the final pathology yet.  The patient reports she been doing fairly well at home but she did have a little bit of bleeding on the Steri-Strips.    On examination, the Steri-Strips were removed and the incision looks excellent, there might be a little bit of fluid at the superior aspect but it is minimal and does not require drainage.  There is no evidence of any infection.  Her facial nerve function is completely normal.  Examination of oral cavity does reveal she has a little bit of trismus.  She notes that the Steri-Strips seem to prevent her from opening her mouth all the way.    We will have France Lockhart and speech therapy help her with some jaw stretching exercises.  Once the pathology is back, we will send it to her in my chart, assuming it is benign, she may follow-up as needed.    Armani Fraser M.D.

## 2023-11-08 NOTE — NURSING NOTE
"Chief Complaint   Patient presents with    RECHECK     1 week post op      Blood pressure 128/88, pulse 89, temperature 97.4  F (36.3  C), height 1.626 m (5' 4\"), weight 51.3 kg (113 lb), last menstrual period 11/01/2021, SpO2 99%.  Nicolas Grigsby LPN    "

## 2023-11-08 NOTE — PROGRESS NOTES
The patient returns 1 week out from a right sided superficial parotidectomy.  The pathology on frozen section was a pleomorphic adenoma but we do not have the final pathology yet.  The patient reports she been doing fairly well at home but she did have a little bit of bleeding on the Steri-Strips.    On examination, the Steri-Strips were removed and the incision looks excellent, there might be a little bit of fluid at the superior aspect but it is minimal and does not require drainage.  There is no evidence of any infection.  Her facial nerve function is completely normal.  Examination of oral cavity does reveal she has a little bit of trismus.  She notes that the Steri-Strips seem to prevent her from opening her mouth all the way.    We will have France Lockhart and speech therapy help her with some jaw stretching exercises.  Once the pathology is back, we will send it to her in my chart, assuming it is benign, she may follow-up as needed.    Armani Fraser M.D.

## 2023-11-09 LAB
LAB DIRECTOR COMMENTS: NORMAL
LAB DIRECTOR DISCLAIMER: NORMAL
LAB DIRECTOR INTERPRETATION: NORMAL
LAB DIRECTOR METHODOLOGY: NORMAL
LAB DIRECTOR RESULTS: NORMAL
PATH REPORT.COMMENTS IMP SPEC: NORMAL
PATH REPORT.FINAL DX SPEC: NORMAL
PATH REPORT.GROSS SPEC: NORMAL
PATH REPORT.INTRAOP OBS SPEC DOC: NORMAL
PATH REPORT.MICROSCOPIC SPEC OTHER STN: NORMAL
PATH REPORT.RELEVANT HX SPEC: NORMAL
PHOTO IMAGE: NORMAL
SPECIMEN DESCRIPTION: NORMAL

## 2023-11-09 PROCEDURE — G0452 MOLECULAR PATHOLOGY INTERPR: HCPCS | Mod: 26 | Performed by: STUDENT IN AN ORGANIZED HEALTH CARE EDUCATION/TRAINING PROGRAM

## 2023-11-09 PROCEDURE — 81456 SO/HL 51/>GSAP RNA ALYS: CPT | Performed by: OTOLARYNGOLOGY

## 2023-11-09 NOTE — PATIENT INSTRUCTIONS
1. We will call you with pathology results.   2. Please call the ENT clinic with any questions,concerns, new or worsening symptoms.    -Clinic number is [phone]   - Teri's direct line (Dr. Fraser's nurse) [phone]

## 2023-11-10 ENCOUNTER — PATIENT OUTREACH (OUTPATIENT)
Dept: OTOLARYNGOLOGY | Facility: CLINIC | Age: 58
End: 2023-11-10
Payer: COMMERCIAL

## 2023-11-10 NOTE — TELEPHONE ENCOUNTER
Called and spoke with patient regarding the following pathology results:      Final Diagnosis   A. 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 Advised patient she can return as needed with Dr. Fraser. Patient verbalized understanding and will call with further questions or concerns.     Patient is doing well post op. She continues to work on jaw range of motion but she will contact writer if she has any new or worsening symptoms.     Teri Ramirez, RN, BSN

## 2024-03-10 ENCOUNTER — HEALTH MAINTENANCE LETTER (OUTPATIENT)
Age: 59
End: 2024-03-10

## 2025-03-16 ENCOUNTER — HEALTH MAINTENANCE LETTER (OUTPATIENT)
Age: 60
End: 2025-03-16

## 2025-03-24 NOTE — DISCHARGE INSTRUCTIONS
Date seen in ED/UC: 3/22    For problem of: Closed fracture of head of left humerus DOI 3/22 (Diagnosis from referral)     Please triage and route back for scheduling. (P OS[facility] ORTHO SCHED RECEP [facility] [phone])    ATTENTION:   IF PATIENT CALLS PRIOR TO IN DEPARTMENT CLINICAL TRIAGE COMPLETION, PLEASE LET THE PATIENT KNOW THEIR CONCERN IS BEING HANDLED AND WE WILL BE CALLING THEM BACK IN A TIMELY MANNER.      [facility]  Same-Day Surgery   Adult Discharge Orders & Instructions     For 24 hours after surgery    Get plenty of rest.  A responsible adult must stay with you for at least 24 hours after you leave the hospital.   Do not drive or use heavy equipment.  If you have weakness or tingling, don't drive or use heavy equipment until this feeling goes away.  Do not drink alcohol.  Avoid strenuous or risky activities.  Ask for help when climbing stairs.   You may feel lightheaded.  IF so, sit for a few minutes before standing.  Have someone help you get up.   If you have nausea (feel sick to your stomach): Drink only clear liquids such as apple juice, ginger ale, broth or 7-Up.  Rest may also help.  Be sure to drink enough fluids.  Move to a regular diet as you feel able.  You may have a slight fever. Call the doctor if your fever is over 100 F (37.7 C) (taken under the tongue) or lasts longer than 24 hours.  You may have a dry mouth, a sore throat, muscle aches or trouble sleeping.  These should go away after 24 hours.  Do not make important or legal decisions.   Call your doctor for any of the followin.  Signs of infection (fever, growing tenderness at the surgery site, a large amount of drainage or bleeding, severe pain, foul-smelling drainage, redness, swelling).    2. It has been over 8 to 10 hours since surgery and you are still not able to urinate (pass water).    3.  Headache for over 24 hours.    To contact a doctor, call Dr Fraser at the ENT - Otolaryngology clinic at [phone]  or:    '   [phone] and ask for the resident on call for Otolaryngology (answered 24 hours a day)  '   Emergency Department:    [facility]: [phone]       (TTY for hearing impaired: [phone])    [facility]: [phone]       (TTY for hearing impaired: [phone])

## (undated) DEVICE — DRAPE SHEET REV FOLD 3/4 9349

## (undated) DEVICE — PREP POVIDONE-IODINE 10% SOLUTION 4OZ BOTTLE MDS093944

## (undated) DEVICE — NIM PROBE NS STD INCR PRASS TIP STRL LF DISP 8225825X

## (undated) DEVICE — BLADE KNIFE SURG 15 371115

## (undated) DEVICE — SU SILK 2-0 SH CR 5X18" C0125

## (undated) DEVICE — GLOVE BIOGEL PI MICRO SZ 7.0 48570

## (undated) DEVICE — DRSG TELFA 3X8" 1238

## (undated) DEVICE — PACK NEURO MINOR UMMC SNE32MNMU4

## (undated) DEVICE — CLIP HORIZON MED BLUE 002200

## (undated) DEVICE — SUCTION MANIFOLD NEPTUNE 2 SYS 4 PORT 0702-020-000

## (undated) DEVICE — DRAIN JACKSON PRATT 07FR ROUND SU130-1320

## (undated) DEVICE — SUCTION SLEEVE NEPTUNE 2 125MM 0703-005-125

## (undated) DEVICE — SPONGE KITTNER 30-101

## (undated) DEVICE — PREP SKIN SCRUB TRAY 4461A

## (undated) DEVICE — SU SILK 3-0 TIE 12X30" A304H

## (undated) DEVICE — GLOVE BIOGEL PI MICRO SZ 7.5 48575

## (undated) DEVICE — NIM ELEC SUBDERMAL NDL 3PAIR/BOX

## (undated) DEVICE — RETR ELASTIC STAYS LONE STAR BLUNT DUAL LEAD 3550-1G

## (undated) DEVICE — STRAP UNIVERSAL POSITIONING 2-PIECE 4X47X76" 91-287

## (undated) DEVICE — SU VICRYL 3-0 SH 8X18" UND J864D

## (undated) DEVICE — ESU GROUND PAD ADULT W/CORD E7507

## (undated) DEVICE — Device

## (undated) DEVICE — CLIP HORIZON SM RED WIDE SLOT 001201

## (undated) DEVICE — SU SILK 2-0 TIE 12X30" A305H

## (undated) DEVICE — SU ETHILON 3-0 PS-1 18" 1663H

## (undated) DEVICE — LINEN TOWEL PACK X30 5481

## (undated) DEVICE — DRSG STERI STRIP 1/4X4" R1546

## (undated) DEVICE — ESU CORD BIPOLAR AND IRR TUBING AESCULAP US355

## (undated) DEVICE — PREP POVIDONE-IODINE 7.5% SCRUB 4OZ BOTTLE MDS093945

## (undated) DEVICE — SPONGE RAY-TEC 4X8" 7318

## (undated) DEVICE — SU MONOCRYL 5-0 P-3 18" UND Y493G

## (undated) DEVICE — DRAPE POUCH INSTRUMENT 1018

## (undated) RX ORDER — CEFAZOLIN SODIUM/WATER 2 G/20 ML
SYRINGE (ML) INTRAVENOUS
Status: DISPENSED
Start: 2023-11-02

## (undated) RX ORDER — GLYCOPYRROLATE 0.2 MG/ML
INJECTION, SOLUTION INTRAMUSCULAR; INTRAVENOUS
Status: DISPENSED
Start: 2023-11-02

## (undated) RX ORDER — HYDROMORPHONE HYDROCHLORIDE 1 MG/ML
INJECTION, SOLUTION INTRAMUSCULAR; INTRAVENOUS; SUBCUTANEOUS
Status: DISPENSED
Start: 2023-11-02

## (undated) RX ORDER — FENTANYL CITRATE 50 UG/ML
INJECTION, SOLUTION INTRAMUSCULAR; INTRAVENOUS
Status: DISPENSED
Start: 2023-11-02